# Patient Record
Sex: MALE | Race: BLACK OR AFRICAN AMERICAN
[De-identification: names, ages, dates, MRNs, and addresses within clinical notes are randomized per-mention and may not be internally consistent; named-entity substitution may affect disease eponyms.]

---

## 2018-08-21 ENCOUNTER — HOSPITAL ENCOUNTER (OUTPATIENT)
Dept: HOSPITAL 35 - PAIN | Age: 63
End: 2018-08-21
Attending: ANESTHESIOLOGY
Payer: OTHER GOVERNMENT

## 2018-08-21 VITALS — WEIGHT: 175 LBS | BODY MASS INDEX: 26.52 KG/M2 | HEIGHT: 67.99 IN

## 2018-08-21 VITALS — DIASTOLIC BLOOD PRESSURE: 101 MMHG | SYSTOLIC BLOOD PRESSURE: 156 MMHG

## 2018-08-21 DIAGNOSIS — M50.123: ICD-10-CM

## 2018-08-21 DIAGNOSIS — M47.22: Primary | ICD-10-CM

## 2018-08-21 DIAGNOSIS — M48.02: ICD-10-CM

## 2018-08-21 DIAGNOSIS — G89.4: ICD-10-CM

## 2018-09-25 ENCOUNTER — HOSPITAL ENCOUNTER (OUTPATIENT)
Dept: HOSPITAL 35 - PAIN | Age: 63
End: 2018-09-25
Attending: ANESTHESIOLOGY
Payer: OTHER GOVERNMENT

## 2018-09-25 VITALS — WEIGHT: 178 LBS | BODY MASS INDEX: 26.98 KG/M2 | HEIGHT: 67.99 IN

## 2018-09-25 VITALS — SYSTOLIC BLOOD PRESSURE: 155 MMHG | DIASTOLIC BLOOD PRESSURE: 107 MMHG

## 2018-09-25 DIAGNOSIS — M50.10: ICD-10-CM

## 2018-09-25 DIAGNOSIS — M47.12: Primary | ICD-10-CM

## 2018-09-25 DIAGNOSIS — Z79.899: ICD-10-CM

## 2018-09-25 DIAGNOSIS — M48.02: ICD-10-CM

## 2018-09-25 DIAGNOSIS — G89.4: ICD-10-CM

## 2018-10-09 ENCOUNTER — HOSPITAL ENCOUNTER (OUTPATIENT)
Dept: HOSPITAL 35 - PAIN | Age: 63
Discharge: HOME | End: 2018-10-09
Attending: ANESTHESIOLOGY
Payer: OTHER GOVERNMENT

## 2018-10-09 VITALS — HEIGHT: 67.99 IN | WEIGHT: 178.4 LBS | BODY MASS INDEX: 27.04 KG/M2

## 2018-10-09 VITALS — DIASTOLIC BLOOD PRESSURE: 110 MMHG | SYSTOLIC BLOOD PRESSURE: 163 MMHG

## 2018-10-09 DIAGNOSIS — M48.02: ICD-10-CM

## 2018-10-09 DIAGNOSIS — G89.29: ICD-10-CM

## 2018-10-09 DIAGNOSIS — M50.10: Primary | ICD-10-CM

## 2018-10-09 DIAGNOSIS — Z79.891: ICD-10-CM

## 2018-10-09 DIAGNOSIS — Z98.890: ICD-10-CM

## 2018-10-09 DIAGNOSIS — M47.22: ICD-10-CM

## 2018-10-09 DIAGNOSIS — I10: ICD-10-CM

## 2018-11-22 ENCOUNTER — HOSPITAL ENCOUNTER (EMERGENCY)
Dept: HOSPITAL 35 - ER | Age: 63
Discharge: HOME | End: 2018-11-22
Payer: OTHER GOVERNMENT

## 2018-11-22 VITALS — WEIGHT: 174.01 LBS | HEIGHT: 68 IN | BODY MASS INDEX: 26.37 KG/M2

## 2018-11-22 VITALS — SYSTOLIC BLOOD PRESSURE: 145 MMHG | DIASTOLIC BLOOD PRESSURE: 93 MMHG

## 2018-11-22 DIAGNOSIS — G89.29: ICD-10-CM

## 2018-11-22 DIAGNOSIS — I10: ICD-10-CM

## 2018-11-22 DIAGNOSIS — M79.602: ICD-10-CM

## 2018-11-22 DIAGNOSIS — M54.12: Primary | ICD-10-CM

## 2018-12-26 ENCOUNTER — HOSPITAL ENCOUNTER (OUTPATIENT)
Dept: HOSPITAL 35 - PAIN | Age: 63
End: 2018-12-26
Attending: ANESTHESIOLOGY
Payer: OTHER GOVERNMENT

## 2018-12-26 VITALS — DIASTOLIC BLOOD PRESSURE: 98 MMHG | SYSTOLIC BLOOD PRESSURE: 161 MMHG

## 2018-12-26 VITALS — BODY MASS INDEX: 26.86 KG/M2 | HEIGHT: 67.99 IN | WEIGHT: 177.2 LBS

## 2018-12-26 DIAGNOSIS — M47.22: Primary | ICD-10-CM

## 2018-12-26 DIAGNOSIS — M48.02: ICD-10-CM

## 2018-12-26 DIAGNOSIS — G89.4: ICD-10-CM

## 2018-12-26 DIAGNOSIS — I10: ICD-10-CM

## 2019-01-08 ENCOUNTER — HOSPITAL ENCOUNTER (OUTPATIENT)
Dept: HOSPITAL 35 - PAIN | Age: 64
Discharge: HOME | End: 2019-01-08
Attending: ANESTHESIOLOGY
Payer: OTHER GOVERNMENT

## 2019-01-08 VITALS — WEIGHT: 177.8 LBS | HEIGHT: 67.99 IN | BODY MASS INDEX: 26.95 KG/M2

## 2019-01-08 VITALS — DIASTOLIC BLOOD PRESSURE: 91 MMHG | SYSTOLIC BLOOD PRESSURE: 165 MMHG

## 2019-01-08 DIAGNOSIS — G89.29: ICD-10-CM

## 2019-01-08 DIAGNOSIS — M47.22: ICD-10-CM

## 2019-01-08 DIAGNOSIS — M50.10: Primary | ICD-10-CM

## 2019-01-08 DIAGNOSIS — M48.02: ICD-10-CM
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
## 2019-01-08 NOTE — NUR
Pain Clinic Assessment:
 
1. History of Osteoarthritis:
Not Applicable
   History of Rheumatoid Arthritis:
Not Applicable
 
2. Height: 5 ft. 8 in. 172.7 cm.
   Weight: 177.8 lb.  oz. 80.650 kg.
   Patient's BMI: 27.0
 
3. Vital Signs:
   BP: 165/91 Pulse: 66 Resp: 20
   Temp:  02 Sat: 99 ECG Mon:
 
4. Pain Intensity: 9
 
5. Fall Risk:
   Dizziness: N  Needs help standing or walking: N
   Fallen in the last 3 months: N
   Fall risk comments:
 
 
6. Patient on Blood Thinner: None
 
7. History of Hypertension: N
 
8. Opioid Therapy greater than 6 weeks: N
   Opiate Contract Signed:
 
9. Risk Assessment Tool Provided: LOW RISK 0/3
 
10. Functional Assessment Tool: 67/70
 
11. Recreational Drug Use: Past greater than 3 mos Drug Type:
    Tobacco Use: Never Smoker Tobacco Type:
       Amount or Packs/day:  How Many Years:
    Alcohol Use: No  Frequency:  Quant:

## 2019-09-11 ENCOUNTER — HOSPITAL ENCOUNTER (OUTPATIENT)
Dept: HOSPITAL 35 - PAIN | Age: 64
Discharge: HOME | End: 2019-09-11
Attending: ANESTHESIOLOGY
Payer: OTHER GOVERNMENT

## 2019-09-11 VITALS — SYSTOLIC BLOOD PRESSURE: 144 MMHG | DIASTOLIC BLOOD PRESSURE: 96 MMHG

## 2019-09-11 VITALS — WEIGHT: 175.6 LBS | HEIGHT: 67.99 IN | BODY MASS INDEX: 26.61 KG/M2

## 2019-09-11 DIAGNOSIS — Z98.890: ICD-10-CM

## 2019-09-11 DIAGNOSIS — M48.02: ICD-10-CM

## 2019-09-11 DIAGNOSIS — G89.29: ICD-10-CM

## 2019-09-11 DIAGNOSIS — M50.10: Primary | ICD-10-CM

## 2019-09-11 DIAGNOSIS — M47.22: ICD-10-CM

## 2019-09-11 NOTE — NUR
Pain Clinic Assessment:
 
1. History of Osteoarthritis:
Not Applicable
   History of Rheumatoid Arthritis:
Not Applicable
 
2. Height: 5 ft. 8 in. 172.7 cm.
   Weight: 175.6 lb.  oz. 79.652 kg.
   Patient's BMI: 26.7
 
3. Vital Signs:
   BP: 144/96 Pulse: 61 Resp: 14
   Temp:  02 Sat: 100 ECG Mon:
 
4. Pain Intensity: 8
 
5. Fall Risk:
   Dizziness: N  Needs help standing or walking: N
   Fallen in the last 3 months: N
   Fall risk comments:
 
 
6. Patient on Blood Thinner: None
 
7. History of Hypertension: N
 
8. Opioid Therapy greater than 6 weeks: N
   Opiate Contract Signed:
 
9. Risk Assessment Tool Provided: LOW RISK 0/3
 
10. Functional Assessment Tool: 67/70
 
11. Recreational Drug Use: Past greater than 3 mos Drug Type:
    Tobacco Use: Never Smoker Tobacco Type:
       Amount or Packs/day:  How Many Years:
    Alcohol Use: No  Frequency:  Quant:

## 2019-10-04 ENCOUNTER — HOSPITAL ENCOUNTER (INPATIENT)
Dept: HOSPITAL 35 - ER | Age: 64
LOS: 1 days | Discharge: HOME | DRG: 74 | End: 2019-10-05
Attending: HOSPITALIST | Admitting: HOSPITALIST
Payer: OTHER GOVERNMENT

## 2019-10-04 VITALS — WEIGHT: 172 LBS | BODY MASS INDEX: 25.48 KG/M2 | HEIGHT: 69.02 IN

## 2019-10-04 VITALS — SYSTOLIC BLOOD PRESSURE: 139 MMHG | DIASTOLIC BLOOD PRESSURE: 89 MMHG

## 2019-10-04 VITALS — SYSTOLIC BLOOD PRESSURE: 173 MMHG | DIASTOLIC BLOOD PRESSURE: 114 MMHG

## 2019-10-04 VITALS — DIASTOLIC BLOOD PRESSURE: 62 MMHG | SYSTOLIC BLOOD PRESSURE: 105 MMHG

## 2019-10-04 VITALS — SYSTOLIC BLOOD PRESSURE: 139 MMHG | DIASTOLIC BLOOD PRESSURE: 97 MMHG

## 2019-10-04 VITALS — SYSTOLIC BLOOD PRESSURE: 166 MMHG | DIASTOLIC BLOOD PRESSURE: 106 MMHG

## 2019-10-04 VITALS — SYSTOLIC BLOOD PRESSURE: 146 MMHG | DIASTOLIC BLOOD PRESSURE: 104 MMHG

## 2019-10-04 VITALS — SYSTOLIC BLOOD PRESSURE: 166 MMHG | DIASTOLIC BLOOD PRESSURE: 113 MMHG

## 2019-10-04 DIAGNOSIS — M54.12: ICD-10-CM

## 2019-10-04 DIAGNOSIS — Z80.0: ICD-10-CM

## 2019-10-04 DIAGNOSIS — I16.0: ICD-10-CM

## 2019-10-04 DIAGNOSIS — Z80.1: ICD-10-CM

## 2019-10-04 DIAGNOSIS — M79.2: Primary | ICD-10-CM

## 2019-10-04 DIAGNOSIS — Z82.49: ICD-10-CM

## 2019-10-04 DIAGNOSIS — I10: ICD-10-CM

## 2019-10-04 LAB
ALBUMIN SERPL-MCNC: 3.4 G/DL (ref 3.4–5)
ALT SERPL-CCNC: 27 U/L (ref 30–65)
ANION GAP SERPL CALC-SCNC: 10 MMOL/L (ref 7–16)
ANISOCYTOSIS BLD QL SMEAR: (no result)
APTT BLD: 31 SECONDS (ref 24.5–32.8)
AST SERPL-CCNC: 28 U/L (ref 15–37)
BASOPHILS NFR BLD AUTO: 1 % (ref 0–2)
BILIRUB SERPL-MCNC: 0.7 MG/DL
BUN SERPL-MCNC: 7 MG/DL (ref 7–18)
CALCIUM SERPL-MCNC: 8.4 MG/DL (ref 8.5–10.1)
CHLORIDE SERPL-SCNC: 108 MMOL/L (ref 98–107)
CHOLEST SERPL-MCNC: 179 MG/DL (ref ?–200)
CO2 SERPL-SCNC: 26 MMOL/L (ref 21–32)
CREAT SERPL-MCNC: 0.8 MG/DL (ref 0.7–1.3)
EOSINOPHIL NFR BLD: 4 % (ref 0–3)
ERYTHROCYTE [DISTWIDTH] IN BLOOD BY AUTOMATED COUNT: 13.5 % (ref 10.5–14.5)
GLUCOSE SERPL-MCNC: 129 MG/DL (ref 74–106)
GRANULOCYTES NFR BLD MANUAL: 40 % (ref 36–66)
HCT VFR BLD CALC: 46 % (ref 42–52)
HDLC SERPL-MCNC: 101 MG/DL (ref 40–?)
HGB BLD-MCNC: 14.8 GM/DL (ref 14–18)
INR PPP: 1
LDLC SERPL-MCNC: 58 MG/DL (ref ?–100)
LYMPHOCYTES NFR BLD AUTO: 45 % (ref 24–44)
MAGNESIUM SERPL-MCNC: 1.9 MG/DL (ref 1.8–2.4)
MCH RBC QN AUTO: 31.2 PG (ref 26–34)
MCHC RBC AUTO-ENTMCNC: 32.2 G/DL (ref 28–37)
MCV RBC: 96.9 FL (ref 80–100)
MONOCYTES NFR BLD: 10 % (ref 1–8)
NEUTROPHILS # BLD: 1.9 THOU/UL (ref 1.4–8.2)
PLATELET # BLD EST: (no result) 10*3/UL
PLATELET # BLD: 127 THOU/UL (ref 150–400)
POIKILOCYTOSIS BLD QL SMEAR: (no result)
POTASSIUM SERPL-SCNC: 3.5 MMOL/L (ref 3.5–5.1)
PROT SERPL-MCNC: 6.7 G/DL (ref 6.4–8.2)
PROTHROMBIN TIME: 10.7 SECONDS (ref 9.3–11.4)
RBC # BLD AUTO: 4.75 MIL/UL (ref 4.5–6)
SODIUM SERPL-SCNC: 144 MMOL/L (ref 136–145)
TC:HDL: 1.8 RATIO
TRIGL SERPL-MCNC: 100 MG/DL (ref ?–150)
TROPONIN I SERPL-MCNC: <0.06 NG/ML (ref ?–0.06)
VLDLC SERPL CALC-MCNC: 20 MG/DL (ref ?–40)
WBC # BLD AUTO: 4.8 THOU/UL (ref 4–11)

## 2019-10-04 NOTE — 2DMMODE
[facility]
[address]
[place]  [address]
Phone:  [phone] 2 D/M-MODE ECHOCARDIOGRAM     
_______________________________________________________________________________
 
Name:            BENTON JIE YEE       Room #:        218-P       ADM IN
M.R.#:           [id number]          Account #:     [id number]  
Admission:       10/04/19         Attend Phys:   Ed Cannon MD
Discharge:                  Date of Birth: [birth year]  
                         Report #:      [id number]
        [id number]
_______________________________________________________________________________
THIS REPORT FOR:   //name//                          
 
 
--------------- APPROVED REPORT --------------
 
 
Study performed:  10/04/2019 10:20:28
 
EXAM: Comprehensive 2D, Doppler, and color-flow 
Echocardiogram 
Patient Location: Bedside   
Room #:  218     Status:  routine
 
        BSA:         1.94
HR: 69 bpm   BP:          166/106 mmHg 
Rhythm: Sinus arrhythmia     
 
Other Information 
Study Quality: Good
 
Indications
Chest Pain
HTN
 
2D Dimensions
RVDd:  34.11 mm  
IVSd:  12.00 (7-11mm) LVOT Diam:  24.00 (18-24mm) 
LVDd:  48.00 mm  
PWd:  11.00 (7-11mm) Ascending Ao:  40.10 (22-36mm)
LVDs:  29.00 (25-40mm) 
Aortic Root:  43.02 mm 
 
Volumes
Left Atrial Volume (Systole) 
Single Plane 4CH:  45.66 mL Single Plane 2CH:  49.50 mL
    LA ESV Index:  26.00 mL/m2
 
Aortic Valve
AoV Peak Malik.:  1.41 m/s 
AO Peak Gr.:  7.96 mmHg  LVOT Max P.68 mmHg
    LVOT Max V:  1.08 m/s
LATOYA Vmax: 3.46 cm2  
 
Mitral Valve
    E/A Ratio:  0.9
    MV Decel. Time:  217.15 ms
 
 
[facility]
[address]
[place]  [address]
Phone:  [phone] 2 D/M-MODE ECHOCARDIOGRAM     
_______________________________________________________________________________
 
Name:            JIE HENAO       Room #:        218-P       [facility] [place]
.R.#:           [id number]          Account #:     [id number]  
Admission:       10/04/19         Attend Phys:   Ed Cannon MD
Discharge:                  Date of Birth: [birth year]  
                         Report #:      [id number]
        [id number]
_______________________________________________________________________________
MV E Max Malik.:  0.71 m/s 
MV A Malik.:  0.83 m/s  
MV PHT:  62.97 ms  
IVRT:  92.27 ms   
 
Pulmonary Valve
PV Peak Malik.:  1.30 m/s PV Peak Gr.:  6.80 mmHg
 
Pulmonary Vein
P Vein S:    0.71 m/s P Vein A:  0.35 m/s
P Vein D:   0.38 m/s P Vein A Dur.:  110.7 msec
P Vein S/D Ratio:  1.87 
 
Tricuspid Valve
TR Peak Malik.:  2.51 m/s  RAP Estimate:  5.00 mmHg
TR Peak Gr.:  25.29 mmHg 
    PA Pressure:  30.00 mmHg
 
Left Ventricle
The left ventricle is normal size. There is normal LV segmental wall 
motion. Mild concentric left ventricular hypertrophy. Left 
ventricular systolic function is normal. LVEF is 60-65%. Mild 
diastolic dysfunction is present (impaired relaxation 
pattern).
 
Right Ventricle
The right ventricle is normal size. The right ventricular systolic 
function is normal.
 
Atria
The left atrium size is normal. The right atrium size is 
normal.
 
Aortic Valve
The aortic valve is normal in structure. No aortic regurgitation is 
present. There is no aortic valvular stenosis.
 
Mitral Valve
The mitral valve is normal in structure. There is no mitral valve 
regurgitation noted. No evidence of mitral valve stenosis.
 
Tricuspid Valve
The tricuspid valve is normal in structure. Trace tricuspid 
regurgitation. Estimated PAP is 30mmHg.
 
Pulmonic Valve
 
 
[facility]
[address]
[place]  [address]
Phone:  [phone]                    2 D/M-MODE ECHOCARDIOGRAM     
_______________________________________________________________________________
 
Name:            JIE HENAO       Room #:        218-P       [facility] [place]
.R.#:           [id number]          Account #:     [id number]  
Admission:       10/04/19         Attend Phys:   Ed Cannon MD
Discharge:                  Date of Birth: [birth year]  
                         Report #:      [id number]
        [id number]
_______________________________________________________________________________
A circular echo density is identified which appears to be moving in 
conjunction with the pulmonic valve. It is not well characterized by 
this study. Transesophageal echo is recommended Mild pulmonic 
regurgitation.
 
Great Vessels
Aortic root is dilated at the level of the sinuses at 4.3cm. 
Ascending aorta is dilated at 4.1cm. IVC is normal in size and 
collapses >50% with inspiration.
 
Pericardium
There is no pericardial effusion.
 
<Conclusion>
The left ventricle is normal size.
LVEF is 60-65%.
The aortic valve is normal in structure.
The mitral valve is normal in structure.
The tricuspid valve is normal in structure.
Trace tricuspid regurgitation. Estimated PAP is 30mmHg.
Mild pulmonic regurgitation.
Aortic root is dilated at the level of the sinuses at 
4.3cm.
Ascending aorta is dilated at 4.1cm.
There is no pericardial effusion.
A circular echo density is identified which appears to be moving in 
conjunction with the pulmonic valve. It is not well characterized by 
this study. Transesophageal echo is recommended
 
 
 
 
 
 
 
 
 
 
 
 
 
 
 
 
  <ELECTRONICALLY SIGNED>
   By: River Glass MD    
  10/04/19     1108
D: 10/04/19 1108                           _____________________________________
T: 10/04/19 1108                           River Glass MD      /INF

## 2019-10-04 NOTE — NUR
ASSUMED CARE AT 0830. SEE ASSESSMENT. HAVING INTERMITTENT MIDSTERNAL BRIEF
SHARP PAIN, RESOLVES WITHIN A MINUTE WITH RELAXATION. WIFE AT BEDSIDE.

## 2019-10-04 NOTE — NUR
ADMIT:PT ADMITTED FROM ED WITH CHEST PAIN.ARRIVED TO UNIT VIA STRETCHER
ACCOMPANIED BY HIS WIFE.PT IS A/OX4 IN A LOT OF DISTRESS.HYPERTENSIVE UPON
ARRIVAL TO UNIT,HYDRALAZINE GIVEN AS PER ORDERS.RA W/O RESP DISTRESS.SINUS
RHYTHM ON MONITOR.PT KEEPS HAVING SEVERE INTERMITTENT SHARP CHEST PAIN THAT
DOES NOT RADIATES OR ACCOMPANIED BY ANY OTHER ISSUES.PT STATES PAIN COMES AND
GOES.CTA W/CONTRAST ORDERED BY NP.PT DENIES H/O OF CHEST PAIN BUT DO HAVE A
VERY STRONG FAMILY HISTORY OF CAD.DENIES CURRENTLY TAKING ANY MEDICATION APART
FROM STEROIDS INJECTIONS D/T PAIN IN HIS NECK AND LEFT ARM.IVF INFUSING.PT
AND FAMILY ORIENTED TO UNIT AND UNIT ACTIVITIES.ASSESSMENT COMPLETED AS
DOCUMENTED.PT DENIES ANY OTHER CONCERNS AT THIS TIME.PT NPO.

## 2019-10-04 NOTE — NUR
REMAINED NPO. AT 1015, ECHO PERFORMED AT BEDSIDE SINCE PT HAVING INTERMITTENT
CHEST DISCOMFORT THIS AM. AT 1245, PT TRANSFERRED PER WHEELCHAIR TO NUCLEAR
STRESS TEST. NOW CHEST DISCOMFORT LESS FREQUENT, DECREASED INTENSITY.
SIGNIFICANT OTHER AT BEDSIDE PROVIDING SUPPORT.

## 2019-10-05 VITALS — SYSTOLIC BLOOD PRESSURE: 110 MMHG | DIASTOLIC BLOOD PRESSURE: 63 MMHG

## 2019-10-05 VITALS — SYSTOLIC BLOOD PRESSURE: 124 MMHG | DIASTOLIC BLOOD PRESSURE: 70 MMHG

## 2019-10-05 VITALS — DIASTOLIC BLOOD PRESSURE: 85 MMHG | SYSTOLIC BLOOD PRESSURE: 138 MMHG

## 2019-10-05 VITALS — SYSTOLIC BLOOD PRESSURE: 138 MMHG | DIASTOLIC BLOOD PRESSURE: 85 MMHG

## 2019-10-05 LAB
BILIRUB UR-MCNC: (no result) MG/DL
COLOR UR: (no result)
KETONES UR STRIP-MCNC: NEGATIVE MG/DL
RBC # UR STRIP: NEGATIVE /UL
SP GR UR STRIP: 1.02 (ref 1–1.03)
URINE CLARITY: CLEAR
URINE GLUCOSE-RANDOM*: NEGATIVE
URINE LEUKOCYTES-REFLEX: NEGATIVE
URINE NITRITE-REFLEX: NEGATIVE
URINE PROTEIN (DIPSTICK): NEGATIVE
UROBILINOGEN UR STRIP-ACNC: 4 E.U./DL (ref 0.2–1)

## 2019-10-05 NOTE — NUR
ASSUMED CARE AT SHIFT CHANGE ALERT AND OREANTED X4. HE DENIES ANY CP OR
DISCOMFORT. VSS AND SB @ 57 ON THE MONITOR. DISCHARGE AND MEDICATION
INSTRUCTIONS GIVEN TO PATIENT, AND PATIENT DISCHARGED HOME.

## 2019-10-05 NOTE — NUR
RECEIVED PT'S CARE AROUND 1915; PT. AOX4; C/O PAIN 7/10; ST. NO SHARP
INTERMITENT PAIN; DURING ASSESSMENT C/O PAIN; 6/10; NO SUDDENLY;
INTERMITENT-SHARP PAIN; HS MEDICATION GIVEN; D/C FLUIDS AS PER ORDER; EDUCATED
ABOUT FALL PREVENTION; ST. UNDERSTANDING; EDUCATED ABOUT CALLING BEFORE
STANDING FROM BED FOR THE FIRST TIME; ST. UNDERSTANDING; ABLE TO RES THROUGH
THE NIGHT WITH EYES CLOSED AFTER 2300; PT. DID NOT VOID THROUGH THE NIGHT; PER
NURSE AID; AT 0500 PT. ST. "I WILL PEE AT MY ON TIME"; AT 0540 WRITER
REQUESTED PT. TO VOID; PT. UPSET; USING PROFANITY; SHOUT AT NURSE "LEAVE
SLEEP"; "YOU GUYS HAVE BEING AWAKE THE WHOLE NIGHT"; "I WANT TO REST";
EDUCATED ABOUT THE NEED TO KNOW VOIDING MEASUREMENT; REFUSED EDUCATION;
REFUSED VOIDING; WRITER LEAVE THE ROOM; ASSESSMENT AS CHARGED; FOLLOWING POC;
PASSED ON REPORT;

## 2019-10-06 NOTE — EKG
[address] [employer]
[place]  [address]
Phone:  [phone]                    ELECTROCARDIOGRAM REPORT      
_______________________________________________________________________________
 
Name:       JIE HENAO       Room #:         218-P       DIS IN  
M.R.#:      [id number]     Account #:      [id number]  
Admission:  10/04/19    Attend Phys:    Ed Cannon MD    
Discharge:  10/05/19    Date of Birth:  [birth year]  
                                                          Report #: [id number]
   [id number]
_______________________________________________________________________________
THIS REPORT FOR:   //name//                          
 
                         [facility] ED
                                       
Test Date:    2019-10-04               Test Time:    02:24:17
Pat Name:     JIE YEE         Department:   
Patient ID:   [id number]            Room:         218 P
Gender:       M                        Technician:   ITALO
:          1955               Requested By: Roger Avalos
Order Number: [id number] MD:   Ramon Ellsworth
                                 Measurements
Intervals                              Axis          
Rate:         66                       P:            30
AK:           155                      QRS:          -26
QRSD:         96                       T:            4
QT:           415                                    
QTc:          435                                    
                           Interpretive Statements
Sinus rhythm
Atrial premature complexes
Probable left atrial enlargement
No previous ECG available for comparison
 
Electronically Signed On 10-6-2019 13:15:33 CDT by Ramon Ellsworth
[email or web address]
 
 
 
 
 
 
 
 
 
 
 
 
 
 
 
 
 
 
  <ELECTRONICALLY SIGNED>
   By: Ramon Ellsworth MD, [facility]   
  10/06/19     1315
D: 10224                           _____________________________________
T: 10/04/19 0224                           Ramon Ellsworth MD, FACC     /EPI

## 2019-10-06 NOTE — EKG
[address] [employer]
[place]  [address]
Phone:  [phone]                    ELECTROCARDIOGRAM REPORT      
_______________________________________________________________________________
 
Name:       JIE HENAO       Room #:         218-P       [facility] [place]  
M.R.#:      [id number]     Account #:      [id number]  
Admission:  10/04/19    Attend Phys:    Ed Cannon MD    
Discharge:  10/05/19    Date of Birth:  [birth year]  
                                                          Report #: [id number]
   [id number]
_______________________________________________________________________________
THIS REPORT FOR:   //name//                          
 
                         [facility] ED
                                       
Test Date:    2019-10-04               Test Time:    01:45:44
Pat Name:     JIE YEE         Department:   
Patient ID:   [id number]            Room:         218
Gender:       M                        Technician:   freddie
:          1955               Requested By: Roger Avalos
Order Number: [id number] MD:   Ramon Ellsworth
                                 Measurements
Intervals                              Axis          
Rate:         63                       P:            25
OK:           151                      QRS:          -12
QRSD:         184                      T:            -1
QT:           417                                    
QTc:          427                                    
                           Interpretive Statements
Sinus rhythm
Atrial premature complex
No previous ECG available for comparison
 
Electronically Signed On 10-6-2019 13:15:19 CDT by Ramon Ellsworth
[email or web address]
 
 
 
 
 
 
 
 
 
 
 
 
 
 
 
 
 
 
 
  <ELECTRONICALLY SIGNED>
   By: Ramon Ellsworth MD, [facility]   
  10/06/19     1315
D: 10/04/19 0145                           _____________________________________
T: 10/04/19 0145                           Ramon Ellsworth MD, FACC     /EPI

## 2019-10-07 LAB
EST. AVERAGE GLUCOSE BLD GHB EST-MCNC: 123 MG/DL
GLYCOHEMOGLOBIN (HGB A1C): 5.9 % (ref 4.8–5.6)

## 2020-02-18 ENCOUNTER — HOSPITAL ENCOUNTER (OUTPATIENT)
Dept: HOSPITAL 35 - PAIN | Age: 65
Discharge: HOME | End: 2020-02-18
Attending: ANESTHESIOLOGY
Payer: OTHER GOVERNMENT

## 2020-02-18 VITALS — DIASTOLIC BLOOD PRESSURE: 111 MMHG | SYSTOLIC BLOOD PRESSURE: 167 MMHG

## 2020-02-18 VITALS — WEIGHT: 177.4 LBS | HEIGHT: 67.99 IN | BODY MASS INDEX: 26.89 KG/M2

## 2020-02-18 DIAGNOSIS — Z82.49: ICD-10-CM

## 2020-02-18 DIAGNOSIS — Z79.82: ICD-10-CM

## 2020-02-18 DIAGNOSIS — M47.27: ICD-10-CM

## 2020-02-18 DIAGNOSIS — Z98.890: ICD-10-CM

## 2020-02-18 DIAGNOSIS — Z79.899: ICD-10-CM

## 2020-02-18 DIAGNOSIS — M48.061: ICD-10-CM

## 2020-02-18 DIAGNOSIS — M51.16: Primary | ICD-10-CM

## 2020-02-18 DIAGNOSIS — G89.29: ICD-10-CM

## 2020-02-18 NOTE — NUR
Pain Clinic Assessment:
 
1. History of Osteoarthritis:
BACK
NECK
   History of Rheumatoid Arthritis:
Not Applicable
 
2. Height: 5 ft. 8 in. 172.7 cm.
   Weight: 177.4 lb.  oz. 80.468 kg.
   Patient's BMI: 27.0
 
3. Vital Signs:
   BP: 167/111 Pulse: 68 Resp: 18
   Temp:  02 Sat: 100 ECG Mon:
 
4. Pain Intensity: 10
 
5. Fall Risk:
   Dizziness: N  Needs help standing or walking: N
   Fallen in the last 3 months: N
   Fall risk comments:
 
 
6. Patient on Blood Thinner: None
 
7. History of Hypertension: N
 
8. Opioid Therapy greater than 6 weeks: N
   Opiate Contract Signed:
 
9. Risk Assessment Tool Provided: LOW RISK 0/3
 
10. Functional Assessment Tool: 67/70
 
11. Recreational Drug Use: Past greater than 3 mos Drug Type:
    Tobacco Use: Never Smoker Tobacco Type:
       Amount or Packs/day:  How Many Years:
    Alcohol Use: No  Frequency:  Quant:

## 2020-02-25 NOTE — HPC
[facility]
[place]   [address]                     PAIN MANAGEMENT CONSULTATION  
_______________________________________________________________________________
 
Name:       BENTON YEEJIE DAMON       Room #:                     REG COLTON LIM#:      [id number]                       Account #:      [id number]  
Admission:  02/18/20    Attend Phys:    Raymundo Painter DO  
Discharge:              Date of Birth:  [birth year]  
                                                          Report #: [id number]
                                                                    [id number]   
_______________________________________________________________________________
THIS REPORT FOR:  
 
cc:  [facility] - Clinic physician unknown
     [facility] - Clinic physician unknown                                       
     Raymundo Painter DO                                          ~
THIS REPORT FOR:   //name//                          
 
 
 
DATE OF SERVICE:  02/18/2020
 
 
CHIEF COMPLAINT:  Back pain, bilateral lower extremity pain with paresthesias.
 
HISTORY OF PRESENT ILLNESS:  As you know, the patient is a 64-year-old male who
has been referred back to our service by his neurosurgeon, Dr. Raymundo Mireles,
[facility] for evaluation for lumbar radiculopathy.  The
patient comes to us today with MRI, which shows critical spinal stenosis at the
L4-L5 level with compression of the central canal to 4 mm.  There are also noted
changes at the L5-S1 level with small central disk protrusion with indentation
of the thecal sac, causing close proximity of the left S1 nerve root.  There are
moderate degenerative changes noted at L4-L5 and L5-S1 level.  The patient was
referred to our clinic to undergo lumbar epidural injection under fluoroscopic
guidance to address the ongoing pain issues the patient is experiencing.  The
patient also reports he is having sexual difficulty, but no significant weakness
in the lower extremities to date.  He is being evaluated for surgical
decompression as this will likely be necessary given the severity of the
findings at the L4-L5 level, but has been referred to our clinic to trial an
epidural injection to determine if his pain can be improved as they await the
surgical option.  The patient denies injury or trauma that may have led to
symptoms occurrence.
 
ALLERGIES:  No known drug allergies.
 
CURRENT MEDICATIONS:  Aspirin 81 mg per day, Lotensin 20 mg once a day,
metoprolol 25 mg once a day.
 
SOCIAL HISTORY:  The patient denies tobacco, alcohol, IV or illicit drug use. 
He is employed, working, not receiving workmen's compensation, unaccompanied
today.
 
IMAGING:  MRI of the lumbar spine obtained 12/03/2019 shows moderate
degenerative changes at L4-L5 and L5-S1.  There are changes at the L5-S1 level
central disk extrusion indenting the thecal sac, causing proximity, but no
direct impingement upon the left S1 nerve root.  There is severe near critical
central canal stenosis with AP diameter of the canal measuring 4 mm.
 
 
 
[address]   [address]                     PAIN MANAGEMENT CONSULTATION  
_______________________________________________________________________________
 
Name:       JIE HENAO       Room #:                     REG CL 
RAPHAEL#:      [id number]                       Account #:      [id number]  
Admission:  02/18/20    Attend Phys:    Raymundo Painter DO  
Discharge:              Date of Birth:  [birth year]  
                                                          Report #: [id number]
                                                                    [id number]   
_______________________________________________________________________________
 
PHYSICAL EXAMINATION:
VITAL SIGNS:  Blood pressure 167/111, pulse 68, respiratory rate 18 and
unlabored.  The patient is 100% on room air.  Height 5 feet 8 inches tall,
weight 177.4 pounds, and BMI calculated 27.0.
GENERAL:  Well-developed, well-nourished, well-hydrated 64-year-old male
appearing stated age, pain is rated at 10/10.
HEENT:  Normocephalic, atraumatic.  Pupils are equal, round, reactive to light. 
Extraocular muscles are intact.
EXTREMITIES:  Show no clubbing, no cyanosis, and no edema.
MUSCULOSKELETAL:  Lower extremity strength appears symmetrical 5/5.  Muscle bulk
and tone equal and symmetrical in comparing left lower extremity to right. 
Seated straight leg raising positive.  Supine straight leg raising positive. 
Leonard's test is negative.  Modified Gaenslen's positive for axial low back pain.
 Ankle clonus negative.  Babinski is negative.  The patient does appear to be
intact to light touch from L1 through S2 dermatomes.  They appear symmetrical.
 
ASSESSMENT:
1.  Critical spinal stenosis of the lumbar spine.
2.  Lumbar radiculopathy.
3.  Displacement of lumbar intervertebral disk with radiculopathy.
4.  Lumbosacral spondylosis with radiculopathy.
5.  Chronic intractable pain.
 
PLAN:
1.  The patient has returned today in followup visit per the request of his
neurosurgeon, [facility] to [facility] a lumbar epidural injection
to address the critical spinal stenosis at the L4-L5 level.  The patient and I
discussed at length the findings of his MRI.  I advised the patient that
symptoms may not be resolved with an epidural injection as critical spinal
stenosis typically requires decompression.  I do agree with trying conservative
treatment options initially to determine if his symptoms will improve for the
short term.  We recommend the epidural injection requested.
 
The patient has been advised risks and benefits of a lumbar epidural injection. 
These risks include, but are not necessarily limited to bleeding, bruising,
infection, worsening pain, no relief of pain, also risk of temporary or
permanent muscle weakness, temporary or permanent nerve damage, possible
paralysis, post-dural puncture headache and death.  The patient states
understood and wished to proceed.
2.  The patient will need to follow up with Dr. Mireles at his earliest
convenience to discuss the options for the surgery that will be necessary at the
L4-L5 level.  Given the critical stenosis he is noted to have, there is a
likelihood that his symptoms will progress typically followed closely from pain
standpoint to weakness in the lower extremities.  Further discussion of surgical
decompression will be necessary.
 
 
 
[address] [place]   [address]                     PAIN MANAGEMENT CONSULTATION  
_______________________________________________________________________________
 
Name:       BENTON JIE YEE       Room #:                     REG [facility].#:      [id number]                       Account #:      [id number]  
Admission:  02/18/20    Attend Phys:    Raymundo Painter DO  
Discharge:              Date of Birth:  [birth year]  
                                                          Report #: [id number]
                                                                    [id number]   
_______________________________________________________________________________
3.  No medication changes made at today's visit.  The patient will continue
current medical therapy as prior prescribed.
4.  We will see the patient back in followup visit on an as needed basis for
possible next in the series of epidural injections.  We will also discuss at
that time what the plans from a surgical standpoint have been made.
 
PROCEDURE NOTE
 
DESCRIPTION OF PROCEDURE:  L5-S1 interlaminar epidural steroid injection under
fluoroscopic guidance.
 
After obtaining written consent, the patient was taken back to fluoroscopy
suite, placed in prone position with pillow under abdomen to decrease lumbar
lordosis.  Skin overlying lumbosacral area and then prepped and draped in
aseptic fashion.  Lumbar intervertebral spaces were identified by AP
fluoroscopy.  Skin and subcutaneous tissue overlying target site of injection
and anesthetized with 3 mL of 1% lidocaine.
 
A 20-gauge 3-1/2 inch Tuohy needle advanced under fluoroscopic guidance towards
the epidural space using a parasagittal approach.  Epidural space identified
using loss of resistance to air technique.  After negative aspiration for heme
or cerebrospinal fluid, 1 mL of Omnipaque injected.  Lumbar epidurogram
confirmed using both AP and lateral fluoroscopy.  After negative aspiration for
heme or cerebrospinal fluid, 5 mL of a solution containing 2 mL 40 mg per mL, 80
mg total triamcinolone along with 3 mL lidocaine 1% injected slowly.  Needle
retracted approximately half way, flushed with 1 mL of 1% lidocaine and then
removed.  Sterile bandage placed over injection site.  There were no new motor
deficits present in the lower extremities following procedure.  The patient
tolerated the procedure well, carefully escorted to recovery room in stable
condition.  No apparent complications.  After meeting discharge criteria, the
patient discharged home.
 
 
 
 
 
 
 
 
 
 
 
 
 
  <ELECTRONICALLY SIGNED>
   By: Raymundo Painter DO          
  02/25/20     0745
D: 02/18/20 1250                           _____________________________________
T: 02/18/20 2339                           Raymundo Painter DO            /nt

## 2020-07-19 ENCOUNTER — HOSPITAL ENCOUNTER (EMERGENCY)
Dept: HOSPITAL 35 - ER | Age: 65
End: 2020-07-19
Payer: OTHER GOVERNMENT

## 2020-07-19 DIAGNOSIS — R11.2: Primary | ICD-10-CM

## 2020-07-19 DIAGNOSIS — Z53.21: ICD-10-CM

## 2021-04-14 ENCOUNTER — HOSPITAL ENCOUNTER (EMERGENCY)
Dept: HOSPITAL 35 - ER | Age: 66
Discharge: HOME | End: 2021-04-14
Payer: OTHER GOVERNMENT

## 2021-04-14 VITALS — HEIGHT: 68 IN | BODY MASS INDEX: 26.37 KG/M2 | WEIGHT: 174.01 LBS

## 2021-04-14 VITALS — SYSTOLIC BLOOD PRESSURE: 164 MMHG | DIASTOLIC BLOOD PRESSURE: 103 MMHG

## 2021-04-14 DIAGNOSIS — Z79.899: ICD-10-CM

## 2021-04-14 DIAGNOSIS — M54.5: Primary | ICD-10-CM

## 2021-04-14 DIAGNOSIS — Z79.82: ICD-10-CM

## 2021-04-14 DIAGNOSIS — I10: ICD-10-CM
